# Patient Record
Sex: MALE | Race: WHITE | NOT HISPANIC OR LATINO | Employment: OTHER | URBAN - NONMETROPOLITAN AREA
[De-identification: names, ages, dates, MRNs, and addresses within clinical notes are randomized per-mention and may not be internally consistent; named-entity substitution may affect disease eponyms.]

---

## 2023-10-01 DIAGNOSIS — I10 HYPERTENSION, UNSPECIFIED TYPE: ICD-10-CM

## 2023-10-05 PROBLEM — M54.9 BACK PAIN, ACUTE: Status: ACTIVE | Noted: 2023-10-05

## 2023-10-05 PROBLEM — I25.10 CAD (CORONARY ARTERY DISEASE): Status: ACTIVE | Noted: 2023-10-05

## 2023-10-05 PROBLEM — Z98.84 S/P LAPAROSCOPIC SLEEVE GASTRECTOMY: Status: ACTIVE | Noted: 2023-10-05

## 2023-10-05 PROBLEM — E11.9 DIABETES MELLITUS (MULTI): Status: ACTIVE | Noted: 2023-10-05

## 2023-10-05 PROBLEM — R94.30 ABNORMAL RESULTS OF CARDIOVASCULAR FUNCTION STUDIES: Status: ACTIVE | Noted: 2023-10-05

## 2023-10-05 PROBLEM — E55.9 VITAMIN D DEFICIENCY: Status: ACTIVE | Noted: 2023-10-05

## 2023-10-05 PROBLEM — E83.10 IRON STORAGE DISEASE: Status: ACTIVE | Noted: 2023-10-05

## 2023-10-05 PROBLEM — R94.8 NONSPECIFIC ABNORMAL RESULTS OF FUNCTION STUDY: Status: ACTIVE | Noted: 2023-10-05

## 2023-10-05 PROBLEM — I26.99 PULMONARY EMBOLISM (MULTI): Status: ACTIVE | Noted: 2023-10-05

## 2023-10-05 PROBLEM — N39.0 URINARY TRACT INFECTION: Status: ACTIVE | Noted: 2023-10-05

## 2023-10-05 PROBLEM — G89.18 OTHER ACUTE POSTPROCEDURAL PAIN: Status: ACTIVE | Noted: 2023-10-05

## 2023-10-05 PROBLEM — I10 HYPERTENSION: Status: ACTIVE | Noted: 2023-10-05

## 2023-10-05 PROBLEM — M19.90 ARTHRITIS: Status: ACTIVE | Noted: 2023-10-05

## 2023-10-05 PROBLEM — E78.5 HYPERLIPEMIA: Status: ACTIVE | Noted: 2023-10-05

## 2023-10-05 PROBLEM — R63.8: Status: ACTIVE | Noted: 2023-10-05

## 2023-10-05 PROBLEM — Z98.84 BARIATRIC SURGERY STATUS: Status: ACTIVE | Noted: 2023-10-05

## 2023-10-05 PROBLEM — I49.9 ARRHYTHMIA: Status: ACTIVE | Noted: 2023-10-05

## 2023-10-05 PROBLEM — G47.33 OBSTRUCTIVE SLEEP APNEA SYNDROME: Status: ACTIVE | Noted: 2023-10-05

## 2023-10-05 RX ORDER — CALCIUM CARBONATE 600 MG
1 TABLET ORAL DAILY
COMMUNITY
End: 2023-11-15 | Stop reason: WASHOUT

## 2023-10-05 RX ORDER — AMLODIPINE BESYLATE 5 MG/1
1 TABLET ORAL DAILY
COMMUNITY
End: 2023-11-15 | Stop reason: ALTCHOICE

## 2023-10-05 RX ORDER — IBUPROFEN 100 MG/5ML
1 SUSPENSION, ORAL (FINAL DOSE FORM) ORAL DAILY
COMMUNITY
End: 2023-11-15 | Stop reason: WASHOUT

## 2023-10-05 RX ORDER — TRAMADOL HYDROCHLORIDE 50 MG/1
1 TABLET ORAL 3 TIMES DAILY PRN
COMMUNITY
Start: 2017-01-03 | End: 2023-11-15 | Stop reason: WASHOUT

## 2023-10-05 RX ORDER — AMOXICILLIN 500 MG/1
2000 CAPSULE ORAL
COMMUNITY

## 2023-10-05 RX ORDER — HYDROXYUREA 500 MG/1
1 CAPSULE ORAL 2 TIMES DAILY
COMMUNITY

## 2023-10-05 RX ORDER — LOSARTAN POTASSIUM 100 MG/1
1 TABLET ORAL DAILY
COMMUNITY
End: 2024-05-15 | Stop reason: SDUPTHER

## 2023-10-05 RX ORDER — UBIDECARENONE 75 MG
1 CAPSULE ORAL DAILY
COMMUNITY

## 2023-10-05 RX ORDER — ATORVASTATIN CALCIUM 40 MG/1
1 TABLET, FILM COATED ORAL NIGHTLY
COMMUNITY
End: 2024-05-15 | Stop reason: SDUPTHER

## 2023-10-05 RX ORDER — VIT A/VIT C/VIT E/ZINC/COPPER 2148-113
1 TABLET ORAL EVERY 12 HOURS
COMMUNITY

## 2023-10-05 RX ORDER — TROSPIUM CHLORIDE 20 MG/1
20 TABLET, FILM COATED ORAL 2 TIMES DAILY
COMMUNITY
Start: 2023-08-26

## 2023-10-05 RX ORDER — ASPIRIN 81 MG/1
1 TABLET ORAL DAILY
COMMUNITY

## 2023-10-06 RX ORDER — AMLODIPINE BESYLATE 5 MG/1
5 TABLET ORAL DAILY
Qty: 90 TABLET | Refills: 3 | Status: SHIPPED | OUTPATIENT
Start: 2023-10-06 | End: 2024-05-15 | Stop reason: SDUPTHER

## 2023-11-15 ENCOUNTER — OFFICE VISIT (OUTPATIENT)
Dept: CARDIOLOGY | Facility: CLINIC | Age: 73
End: 2023-11-15
Payer: MEDICARE

## 2023-11-15 VITALS
HEART RATE: 60 BPM | SYSTOLIC BLOOD PRESSURE: 140 MMHG | HEIGHT: 70 IN | DIASTOLIC BLOOD PRESSURE: 80 MMHG | WEIGHT: 253 LBS | BODY MASS INDEX: 36.22 KG/M2

## 2023-11-15 DIAGNOSIS — E78.2 MIXED HYPERLIPIDEMIA: ICD-10-CM

## 2023-11-15 DIAGNOSIS — I25.10 CORONARY ARTERY DISEASE INVOLVING NATIVE CORONARY ARTERY OF NATIVE HEART WITHOUT ANGINA PECTORIS: Primary | ICD-10-CM

## 2023-11-15 DIAGNOSIS — I49.8 OTHER CARDIAC ARRHYTHMIA: ICD-10-CM

## 2023-11-15 DIAGNOSIS — G47.33 OBSTRUCTIVE SLEEP APNEA SYNDROME: ICD-10-CM

## 2023-11-15 DIAGNOSIS — I26.93 SINGLE SUBSEGMENTAL PULMONARY EMBOLISM WITHOUT ACUTE COR PULMONALE (MULTI): ICD-10-CM

## 2023-11-15 DIAGNOSIS — E11.9 TYPE 2 DIABETES MELLITUS WITHOUT COMPLICATION, WITHOUT LONG-TERM CURRENT USE OF INSULIN (MULTI): ICD-10-CM

## 2023-11-15 DIAGNOSIS — I10 PRIMARY HYPERTENSION: ICD-10-CM

## 2023-11-15 PROBLEM — R94.30 ABNORMAL RESULTS OF CARDIOVASCULAR FUNCTION STUDIES: Status: RESOLVED | Noted: 2023-10-05 | Resolved: 2023-11-15

## 2023-11-15 PROCEDURE — 1160F RVW MEDS BY RX/DR IN RCRD: CPT | Performed by: NURSE PRACTITIONER

## 2023-11-15 PROCEDURE — 3077F SYST BP >= 140 MM HG: CPT | Performed by: NURSE PRACTITIONER

## 2023-11-15 PROCEDURE — 3008F BODY MASS INDEX DOCD: CPT | Performed by: NURSE PRACTITIONER

## 2023-11-15 PROCEDURE — 4010F ACE/ARB THERAPY RXD/TAKEN: CPT | Performed by: NURSE PRACTITIONER

## 2023-11-15 PROCEDURE — 99213 OFFICE O/P EST LOW 20 MIN: CPT | Performed by: NURSE PRACTITIONER

## 2023-11-15 PROCEDURE — 3079F DIAST BP 80-89 MM HG: CPT | Performed by: NURSE PRACTITIONER

## 2023-11-15 PROCEDURE — 1159F MED LIST DOCD IN RCRD: CPT | Performed by: NURSE PRACTITIONER

## 2023-11-15 RX ORDER — FUROSEMIDE 20 MG/1
20 TABLET ORAL EVERY OTHER DAY
COMMUNITY
Start: 2023-10-29 | End: 2024-05-15 | Stop reason: SDUPTHER

## 2023-11-15 NOTE — PATIENT INSTRUCTIONS
Please bring all medicines, vitamins, and herbal supplements with you when you come to the office.    Prescriptions will not be filled unless you are compliant with your follow up appointments or have a follow up appointment scheduled as per instruction of your physician. Refills should be requested at the time of your visit.     PLAN:   Through informed decision making process incorporating patients unique circumstances, the following treatment plan will be initiated:    1.  Prescription drug management of cardiovascular medication for efficacy, adherence to treatment, side effect assessment and polypharmacy. Current treatment clinically warranted and to continue without modifications.    2. Referral to [facility] sleep clinic (known FILEMON on bipap with no prior calibration; nocturnal bradycardia)    3. Return for follow-up; in the interim, contact the office if new symptoms arise.   Dr. Fletcher 6 months

## 2023-11-16 ASSESSMENT — ENCOUNTER SYMPTOMS
ORTHOPNEA: 0
SYNCOPE: 0
DYSPNEA ON EXERTION: 0
IRREGULAR HEARTBEAT: 0
PND: 0
NEAR-SYNCOPE: 0
PALPITATIONS: 0

## 2023-11-16 NOTE — ASSESSMENT & PLAN NOTE
November 2021 cardiac cath  Left system normal  Mid/distal RCA 50%  PLV 50%    Current daily activity greater than 4 METS without recurrent symptoms

## 2023-11-16 NOTE — ASSESSMENT & PLAN NOTE
November 2021 atrial tachycardia  Nocturnal bradycardia  November 2021 Holter no evidence of high-grade heart block

## 2023-11-16 NOTE — PROGRESS NOTES
"Chief Complaint  \" Doing perfect\"    Reason for Visit  Routine 6-month follow-up  Patient presents to the office today for outpatient follow-up for hypertension, hyperlipidemia, and coronary artery disease  Last evaluated in clinic by Dr. Beavers May 2023    Presents today ambulatory with steady gait.  Accompanied by spouse  Patient denies any hospitalizations or significant changes to interval medical history since last office follow-up.     History of Present Illness   Patient remains an extremely pleasant 73 gentleman who presents today without voiced complaints.  He reports his wife recently had a CVA and he has been primary caregiver.  He has been doing housework, working on the yard.  He denies any exertional chest pain, no dyspnea on exertion.  He denies any change in exercise capacity or functional tolerance.    Patient reports that overall has no complaint(s) of chest pain, chest pressure/discomfort, exertional chest pressure/discomfort, fatigue, lower extremity edema, palpitations, paroxysmal nocturnal dyspnea, and syncope    The importance of primary prevention reviewed:  HTN: Optimal  HLD: Treated  DM: Diet controlled with hemoglobin A1c 5.1  Smoker: Denies  BMI:  Reviewed the merits of healthy lifestyle choices on overall cardiovascular health.    He does report that occasionally his Apple Watch shows a slow heart rate while he is sleeping.  He does have documented nocturnal bradycardia in the past.  He remains compliant with his BiPAP machine but it has not been checked\" for a number of years\".  Will refer to sleep study for evaluation and calibration of BiPAP.    Overall patient is pleased with current state of cardiovascular health.  At this time there are no indications for additional cardiovascular testing or need for medication changes.     Review of Systems   Cardiovascular:  Negative for chest pain, dyspnea on exertion, irregular heartbeat, leg swelling, near-syncope, orthopnea, palpitations, " "paroxysmal nocturnal dyspnea and syncope.        Visit Vitals  /80 (BP Location: Left arm, Patient Position: Standing)   Pulse 60   Ht 1.778 m (5' 10\")   Wt 115 kg (253 lb)   BMI 36.30 kg/m²   Smoking Status Former   BSA 2.38 m²     Physical Exam  Vitals and nursing note reviewed.   Constitutional:       Appearance: Normal appearance.   Cardiovascular:      Rate and Rhythm: Normal rate and regular rhythm.      Heart sounds: Normal heart sounds.   Pulmonary:      Effort: Pulmonary effort is normal.      Breath sounds: Normal breath sounds.   Musculoskeletal:      Cervical back: Full passive range of motion without pain.      Right lower leg: No edema.      Left lower leg: No edema.   Skin:     General: Skin is cool.   Neurological:      Mental Status: He is alert and oriented to person, place, and time.   Psychiatric:         Attention and Perception: Attention normal.         Mood and Affect: Mood normal.         Behavior: Behavior is cooperative.      No Known Allergies    Current Outpatient Medications   Medication Instructions    amLODIPine (NORVASC) 5 mg, oral, Daily    amoxicillin (AMOXIL) 2,000 mg, oral, 1 hour prior to dental appointments.    aspirin 81 mg EC tablet 1 tablet, oral, Daily    atorvastatin (Lipitor) 40 mg tablet 1 tablet, oral, Nightly    cyanocobalamin (Vitamin B-12) 500 mcg tablet 1 tablet, oral, Daily    furosemide (LASIX) 20 mg, oral, Every other day    Hydrea 500 mg capsule 1 capsule, oral, 2 times daily    losartan (Cozaar) 100 mg tablet 1 tablet, oral, Daily    trospium (SANCTURA) 20 mg, oral, 2 times daily    vitamins A,C,E-zinc-copper (PreserVision AREDS) 2,148 mcg-113 mg-45 mg-17.4mg tablet 1 tablet, oral, Every 12 hours      Assessment:      Arrhythmia  November 2021 atrial tachycardia  Nocturnal bradycardia  November 2021 Holter no evidence of high-grade heart block    CAD (coronary artery disease)  November 2021 cardiac cath  Left system normal  Mid/distal RCA 50%  PLV " "50%    Current daily activity greater than 4 METS without recurrent symptoms    Hyperlipemia  Tolerating moderate intensity statin  Has annual wellness labs    Hypertension  Optimal in office    Pulmonary embolism (CMS/[facility])  September 2021 small left apical PE-no RV strain  DVT in the setting of LLE cellulitis  Reports being off anticoagulation for\" quite a while\"    Diabetes mellitus (CMS/[facility])  Reports diet controlled following weight loss after gastric sleeve  Reports most recent hemoglobin A1c 5.1    BMI 36.0-36.9,adult  Reviewed the merits of healthy lifestyle choices on overall cardiovascular health.      Cardiac and Vasculature  November 2021 TTE  LVEF 55 to 60%    Plan:   Current treatment plan is effective, no change in therapy.  Reviewed diet, exercise and weight control.  Use and side effects of nitroglycerine reviewed, call 911 if chest pain unrelieved by 3 tablets.      Through informed decision making process incorporating patients unique circumstances, the following treatment plan will be initiated:    1.  Prescription drug management of cardiovascular medication for efficacy, adherence to treatment, side effect assessment and polypharmacy. Current treatment clinically warranted and to continue without modifications.    2. Referral to [facility] sleep clinic (known FILEMON on bipap with no prior calibration; nocturnal bradycardia)    3. Return for follow-up; in the interim, contact the office if new symptoms arise.   Dr. Fletcher 6 months     Farzaneh Lewis  MSN, APRN-CNP, PMHNP-BC  [facility]    Please excuse any errors in grammar or translation related to this dictation. Voice recognition software was utilized to prepare this document.  "

## 2023-11-16 NOTE — ASSESSMENT & PLAN NOTE
"September 2021 small left apical PE-no RV strain  DVT in the setting of LLE cellulitis  Reports being off anticoagulation for\" quite a while\"  "

## 2023-11-16 NOTE — ASSESSMENT & PLAN NOTE
Reports diet controlled following weight loss after gastric sleeve  Reports most recent hemoglobin A1c 5.1

## 2024-05-15 ENCOUNTER — OFFICE VISIT (OUTPATIENT)
Dept: CARDIOLOGY | Facility: CLINIC | Age: 74
End: 2024-05-15
Payer: MEDICARE

## 2024-05-15 VITALS
WEIGHT: 254 LBS | DIASTOLIC BLOOD PRESSURE: 78 MMHG | HEART RATE: 62 BPM | HEIGHT: 70 IN | BODY MASS INDEX: 36.36 KG/M2 | SYSTOLIC BLOOD PRESSURE: 140 MMHG

## 2024-05-15 DIAGNOSIS — I25.10 CORONARY ARTERY DISEASE INVOLVING NATIVE CORONARY ARTERY OF NATIVE HEART WITHOUT ANGINA PECTORIS: ICD-10-CM

## 2024-05-15 DIAGNOSIS — Z87.891 FORMER SMOKER: ICD-10-CM

## 2024-05-15 DIAGNOSIS — I10 HYPERTENSION, UNSPECIFIED TYPE: ICD-10-CM

## 2024-05-15 DIAGNOSIS — E78.2 MIXED HYPERLIPIDEMIA: Primary | ICD-10-CM

## 2024-05-15 DIAGNOSIS — I10 PRIMARY HYPERTENSION: ICD-10-CM

## 2024-05-15 PROCEDURE — 3077F SYST BP >= 140 MM HG: CPT | Performed by: INTERNAL MEDICINE

## 2024-05-15 PROCEDURE — 3078F DIAST BP <80 MM HG: CPT | Performed by: INTERNAL MEDICINE

## 2024-05-15 PROCEDURE — 3008F BODY MASS INDEX DOCD: CPT | Performed by: INTERNAL MEDICINE

## 2024-05-15 PROCEDURE — 1159F MED LIST DOCD IN RCRD: CPT | Performed by: INTERNAL MEDICINE

## 2024-05-15 PROCEDURE — 4010F ACE/ARB THERAPY RXD/TAKEN: CPT | Performed by: INTERNAL MEDICINE

## 2024-05-15 PROCEDURE — 99213 OFFICE O/P EST LOW 20 MIN: CPT | Performed by: INTERNAL MEDICINE

## 2024-05-15 RX ORDER — LOSARTAN POTASSIUM 100 MG/1
100 TABLET ORAL DAILY
Qty: 90 TABLET | Refills: 3 | Status: SHIPPED | OUTPATIENT
Start: 2024-05-15 | End: 2025-05-15

## 2024-05-15 RX ORDER — AMLODIPINE BESYLATE 5 MG/1
5 TABLET ORAL DAILY
Qty: 90 TABLET | Refills: 3 | Status: SHIPPED | OUTPATIENT
Start: 2024-05-15 | End: 2025-05-15

## 2024-05-15 RX ORDER — FUROSEMIDE 20 MG/1
20 TABLET ORAL EVERY OTHER DAY
Qty: 45 TABLET | Refills: 3 | Status: SHIPPED | OUTPATIENT
Start: 2024-05-15 | End: 2025-05-15

## 2024-05-15 RX ORDER — ATORVASTATIN CALCIUM 40 MG/1
40 TABLET, FILM COATED ORAL NIGHTLY
Qty: 90 TABLET | Refills: 3 | Status: SHIPPED | OUTPATIENT
Start: 2024-05-15 | End: 2025-05-15

## 2024-05-15 NOTE — PROGRESS NOTES
"Subjective   Parish Bond is a 74 y.o. male       Chief Complaint    Follow-up          HPI     Review of Systems   All other systems reviewed and are negative.     Patient returns in follow-up of problems as noted.  He is doing well.  He denies angina CHF or arrhythmia symptomatology and detailed questioning was undertaken in this regard.  Review of treatment demonstrates that his lipid and blood pressure management is adequate and appropriate because of this we recommend no adjustments or changes in therapy.  The reason and the rationale for continued therapy as before was explained and he agrees to continue as before.      Vitals:    05/15/24 1446   BP: 140/78   BP Location: Right arm   Patient Position: Sitting   Pulse: 62   Weight: 115 kg (254 lb)   Height: 1.778 m (5' 10\")        Objective   Physical Exam  Constitutional:       Appearance: Normal appearance.   HENT:      Nose: Nose normal.   Neck:      Vascular: No carotid bruit.   Cardiovascular:      Rate and Rhythm: Normal rate.      Pulses: Normal pulses.      Heart sounds: Normal heart sounds.   Pulmonary:      Effort: Pulmonary effort is normal.   Abdominal:      General: Bowel sounds are normal.      Palpations: Abdomen is soft.   Musculoskeletal:         General: Normal range of motion.      Cervical back: Normal range of motion.      Right lower leg: No edema.      Left lower leg: No edema.   Skin:     General: Skin is warm and dry.   Neurological:      General: No focal deficit present.      Mental Status: He is alert.   Psychiatric:         Mood and Affect: Mood normal.         Behavior: Behavior normal.         Thought Content: Thought content normal.         Judgment: Judgment normal.         Allergies  Patient has no known allergies.     Current Medications    Current Outpatient Medications:     amLODIPine (Norvasc) 5 mg tablet, TAKE 1 TABLET BY MOUTH DAILY, Disp: 90 tablet, Rfl: 3    amoxicillin (Amoxil) 500 mg capsule, Take 4 capsules (2,000 " mg) by mouth. 1 hour prior to dental appointments., Disp: , Rfl:     aspirin 81 mg EC tablet, Take 1 tablet (81 mg) by mouth once daily., Disp: , Rfl:     atorvastatin (Lipitor) 40 mg tablet, Take 1 tablet (40 mg) by mouth once daily at bedtime., Disp: , Rfl:     cyanocobalamin (Vitamin B-12) 500 mcg tablet, Take 1 tablet (500 mcg) by mouth once daily., Disp: , Rfl:     furosemide (Lasix) 20 mg tablet, Take 1 tablet (20 mg) by mouth every other day., Disp: , Rfl:     Hydrea 500 mg capsule, Take 1 capsule (500 mg) by mouth 2 times a day., Disp: , Rfl:     losartan (Cozaar) 100 mg tablet, Take 1 tablet (100 mg) by mouth once daily., Disp: , Rfl:     trospium (Sanctura) 20 mg tablet, Take 1 tablet (20 mg) by mouth 2 times a day., Disp: , Rfl:     vitamins A,C,E-zinc-copper (PreserVision AREDS) 2,148 mcg-113 mg-45 mg-17.4mg tablet, Take 1 tablet by mouth every 12 hours., Disp: , Rfl:                      Assessment/Plan   1. Coronary artery disease involving native coronary artery of native heart without angina pectoris  No recurrence of symptoms that preceded his evaluation.  - Follow Up In Cardiology    2. Mixed hyperlipidemia  Review of treatment strategy demonstrates good control    3. Primary hypertension  Review of treatment strategy demonstrates good control    4. BMI 36.0-36.9,adult  The merits of diet and weight loss were advocated    5. Former smoker  Congratulated on cessation    6. Hypertension, unspecified type  As above      Scribe Attestation  By signing my name below, I, Canelo Dong LPN   attest that this documentation has been prepared under the direction and in the presence of Logan Fletcher MD.     Provider Attestation - Scribe documentation    All medical record entries made by the Scribe were at my direction and personally dictated by me. I have reviewed the chart and agree that the record accurately reflects my personal performance of the history, physical exam, discussion and plan.

## 2024-05-15 NOTE — LETTER
"May 16, 2024     Glory Russell DO  [address] [place] [address]    Patient: Parish Bond   YOB: 1950   Date of Visit: 5/15/2024       Dear Dr. Glory Russell DO:    Thank you for referring Parish Bond to me for evaluation. Below are my notes for this consultation.  If you have questions, please do not hesitate to call me. I look forward to following your patient along with you.       Sincerely,     Logan Fletcher MD      CC: No Recipients  ______________________________________________________________________________________    Subjective   Parish Bond is a 74 y.o. male       Chief Complaint    Follow-up          HPI     Review of Systems   All other systems reviewed and are negative.     Patient returns in follow-up of problems as noted.  He is doing well.  He denies angina CHF or arrhythmia symptomatology and detailed questioning was undertaken in this regard.  Review of treatment demonstrates that his lipid and blood pressure management is adequate and appropriate because of this we recommend no adjustments or changes in therapy.  The reason and the rationale for continued therapy as before was explained and he agrees to continue as before.      Vitals:    05/15/24 1446   BP: 140/78   BP Location: Right arm   Patient Position: Sitting   Pulse: 62   Weight: 115 kg (254 lb)   Height: 1.778 m (5' 10\")        Objective   Physical Exam  Constitutional:       Appearance: Normal appearance.   HENT:      Nose: Nose normal.   Neck:      Vascular: No carotid bruit.   Cardiovascular:      Rate and Rhythm: Normal rate.      Pulses: Normal pulses.      Heart sounds: Normal heart sounds.   Pulmonary:      Effort: Pulmonary effort is normal.   Abdominal:      General: Bowel sounds are normal.      Palpations: Abdomen is soft.   Musculoskeletal:         General: Normal range of motion.      Cervical back: Normal range of motion.      Right lower leg: No edema.      Left lower leg: No " Straight cath performed, pt tolerated well. Pt david area cleansed.  Perineal area and buttocks with redness noted     Huong Fernandes RN  08/21/20 8391 edema.   Skin:     General: Skin is warm and dry.   Neurological:      General: No focal deficit present.      Mental Status: He is alert.   Psychiatric:         Mood and Affect: Mood normal.         Behavior: Behavior normal.         Thought Content: Thought content normal.         Judgment: Judgment normal.         Allergies  Patient has no known allergies.     Current Medications    Current Outpatient Medications:   •  amLODIPine (Norvasc) 5 mg tablet, TAKE 1 TABLET BY MOUTH DAILY, Disp: 90 tablet, Rfl: 3  •  amoxicillin (Amoxil) 500 mg capsule, Take 4 capsules (2,000 mg) by mouth. 1 hour prior to dental appointments., Disp: , Rfl:   •  aspirin 81 mg EC tablet, Take 1 tablet (81 mg) by mouth once daily., Disp: , Rfl:   •  atorvastatin (Lipitor) 40 mg tablet, Take 1 tablet (40 mg) by mouth once daily at bedtime., Disp: , Rfl:   •  cyanocobalamin (Vitamin B-12) 500 mcg tablet, Take 1 tablet (500 mcg) by mouth once daily., Disp: , Rfl:   •  furosemide (Lasix) 20 mg tablet, Take 1 tablet (20 mg) by mouth every other day., Disp: , Rfl:   •  Hydrea 500 mg capsule, Take 1 capsule (500 mg) by mouth 2 times a day., Disp: , Rfl:   •  losartan (Cozaar) 100 mg tablet, Take 1 tablet (100 mg) by mouth once daily., Disp: , Rfl:   •  trospium (Sanctura) 20 mg tablet, Take 1 tablet (20 mg) by mouth 2 times a day., Disp: , Rfl:   •  vitamins A,C,E-zinc-copper (PreserVision AREDS) 2,148 mcg-113 mg-45 mg-17.4mg tablet, Take 1 tablet by mouth every 12 hours., Disp: , Rfl:                      Assessment/Plan   1. Coronary artery disease involving native coronary artery of native heart without angina pectoris  No recurrence of symptoms that preceded his evaluation.  - Follow Up In Cardiology    2. Mixed hyperlipidemia  Review of treatment strategy demonstrates good control    3. Primary hypertension  Review of treatment strategy demonstrates good control    4. BMI 36.0-36.9,adult  The merits of diet and weight loss were  advocated    5. Former smoker  Congratulated on cessation    6. Hypertension, unspecified type  As above      Scribe Attestation  By signing my name below, I, Canelo Dong LPN   attest that this documentation has been prepared under the direction and in the presence of Logan Fletcher MD.     Provider Attestation - Scribe documentation    All medical record entries made by the Scribe were at my direction and personally dictated by me. I have reviewed the chart and agree that the record accurately reflects my personal performance of the history, physical exam, discussion and plan.

## 2024-11-14 ENCOUNTER — APPOINTMENT (OUTPATIENT)
Dept: CARDIOLOGY | Facility: CLINIC | Age: 74
End: 2024-11-14
Payer: MEDICARE

## 2025-02-03 ENCOUNTER — APPOINTMENT (OUTPATIENT)
Dept: CARDIOLOGY | Facility: CLINIC | Age: 75
End: 2025-02-03
Payer: MEDICARE

## 2025-02-03 VITALS
WEIGHT: 242 LBS | HEIGHT: 70 IN | BODY MASS INDEX: 34.65 KG/M2 | DIASTOLIC BLOOD PRESSURE: 78 MMHG | HEART RATE: 53 BPM | SYSTOLIC BLOOD PRESSURE: 132 MMHG

## 2025-02-03 DIAGNOSIS — E78.2 MIXED HYPERLIPIDEMIA: ICD-10-CM

## 2025-02-03 DIAGNOSIS — I10 PRIMARY HYPERTENSION: ICD-10-CM

## 2025-02-03 DIAGNOSIS — I25.10 CORONARY ARTERY DISEASE INVOLVING NATIVE CORONARY ARTERY OF NATIVE HEART WITHOUT ANGINA PECTORIS: ICD-10-CM

## 2025-02-03 DIAGNOSIS — G47.33 OBSTRUCTIVE SLEEP APNEA SYNDROME: ICD-10-CM

## 2025-02-03 DIAGNOSIS — I25.10 MILD CORONARY ARTERY DISEASE: Primary | ICD-10-CM

## 2025-02-03 DIAGNOSIS — Z87.891 FORMER SMOKER: ICD-10-CM

## 2025-02-03 DIAGNOSIS — I10 HYPERTENSION, UNSPECIFIED TYPE: ICD-10-CM

## 2025-02-03 DIAGNOSIS — Z72.0 CHEWS TOBACCO: ICD-10-CM

## 2025-02-03 PROBLEM — R00.1 SINUS BRADYCARDIA: Status: ACTIVE | Noted: 2025-02-03

## 2025-02-03 PROBLEM — R94.8 NONSPECIFIC ABNORMAL RESULTS OF FUNCTION STUDY: Status: RESOLVED | Noted: 2023-10-05 | Resolved: 2025-02-03

## 2025-02-03 PROCEDURE — 4010F ACE/ARB THERAPY RXD/TAKEN: CPT | Performed by: INTERNAL MEDICINE

## 2025-02-03 PROCEDURE — 3075F SYST BP GE 130 - 139MM HG: CPT | Performed by: INTERNAL MEDICINE

## 2025-02-03 PROCEDURE — 4004F PT TOBACCO SCREEN RCVD TLK: CPT | Performed by: INTERNAL MEDICINE

## 2025-02-03 PROCEDURE — 3078F DIAST BP <80 MM HG: CPT | Performed by: INTERNAL MEDICINE

## 2025-02-03 PROCEDURE — G2211 COMPLEX E/M VISIT ADD ON: HCPCS | Performed by: INTERNAL MEDICINE

## 2025-02-03 PROCEDURE — 93000 ELECTROCARDIOGRAM COMPLETE: CPT | Performed by: INTERNAL MEDICINE

## 2025-02-03 PROCEDURE — 3008F BODY MASS INDEX DOCD: CPT | Performed by: INTERNAL MEDICINE

## 2025-02-03 PROCEDURE — 99214 OFFICE O/P EST MOD 30 MIN: CPT | Performed by: INTERNAL MEDICINE

## 2025-02-03 PROCEDURE — 1160F RVW MEDS BY RX/DR IN RCRD: CPT | Performed by: INTERNAL MEDICINE

## 2025-02-03 PROCEDURE — 1159F MED LIST DOCD IN RCRD: CPT | Performed by: INTERNAL MEDICINE

## 2025-02-03 RX ORDER — DOCUSATE SODIUM 100 MG/1
100 CAPSULE, LIQUID FILLED ORAL 2 TIMES DAILY
COMMUNITY

## 2025-02-03 RX ORDER — FOLIC ACID 1 MG/1
1 TABLET ORAL DAILY
COMMUNITY

## 2025-02-03 RX ORDER — LOSARTAN POTASSIUM 100 MG/1
100 TABLET ORAL DAILY
Qty: 90 TABLET | Refills: 3 | Status: SHIPPED | OUTPATIENT
Start: 2025-02-03 | End: 2026-02-03

## 2025-02-03 RX ORDER — ATORVASTATIN CALCIUM 40 MG/1
40 TABLET, FILM COATED ORAL NIGHTLY
Qty: 90 TABLET | Refills: 3 | Status: SHIPPED | OUTPATIENT
Start: 2025-02-03 | End: 2026-02-03

## 2025-02-03 RX ORDER — AMLODIPINE BESYLATE 5 MG/1
5 TABLET ORAL DAILY
Qty: 90 TABLET | Refills: 3 | Status: SHIPPED | OUTPATIENT
Start: 2025-02-03 | End: 2026-02-03

## 2025-02-03 RX ORDER — BISMUTH SUBSALICYLATE 262 MG
1 TABLET,CHEWABLE ORAL DAILY
COMMUNITY

## 2025-02-03 NOTE — LETTER
February 3, 2025     Glory Russell DO  [address] [place] [address]    Patient: Parish Bond   YOB: 1950   Date of Visit: 2/3/2025       Dear Dr. Glory Russell DO:    Thank you for referring Parish Bond to me for evaluation. Below are my notes for this consultation.  If you have questions, please do not hesitate to call me. I look forward to following your patient along with you.       Sincerely,     Dmitriy Chen MD      CC: No Recipients  ______________________________________________________________________________________    Subjective   Parish Bond is a 74 y.o. male       Chief Complaint    Follow-up          HPI   Patient is here for follow-up continue management for history of coronary artery disease, hypertension and hyperlipidemia.  He is a former patient of Dr. Fletcher.  Back in 2021 he presented with small acute coronary syndrome.  Cardiac catheterization showed only mild to moderate disease of the RCA medical therapy was recommended since then the patient reports he is done well.  He denies complaint of chest pain, palpitation, lightheadedness, dizziness or syncope.  He reports some social stress because his wife passed away due to complication of pneumonia and respiratory failure.  His recent lab noted and reviewed with him.    Assessment    1.  Mild atherosclerotic coronary artery disease diagnosed after presentation with small acute coronary syndrome.  Heart cath was done 2021 by Dr. Bahena showing mild disease of the distal RCA.  He described functional class I and denying any symptoms  2.  Primary hypertension well-controlled  3.  Mixed hyperlipidemia  4.  Obesity  5.  COPD  6.  Continued chewing tobacco  7.  Social stress due to the passing of his wife due to complication of pneumonia and respiratory failure  8.  Mild sinus bradycardia    Plan    1.  I reviewed with him his medication and his recent lab  2.  We discussed risk factor modification  3.   "Advised him to notify me change in cardiac status or symptoms  Review of Systems   All other systems reviewed and are negative.           Vitals:    02/03/25 0947   BP: 132/78   BP Location: Left arm   Patient Position: Sitting   Pulse: 53   Weight: 110 kg (242 lb)   Height: 1.778 m (5' 10\")      EKG done in office today     Objective   Physical Exam  Constitutional:       Appearance: Normal appearance.   HENT:      Nose: Nose normal.   Neck:      Vascular: No carotid bruit.   Cardiovascular:      Rate and Rhythm: Bradycardia present.      Pulses: Normal pulses.      Heart sounds: Normal heart sounds.   Pulmonary:      Effort: Pulmonary effort is normal.   Abdominal:      General: Bowel sounds are normal.      Palpations: Abdomen is soft.   Musculoskeletal:         General: Normal range of motion.      Cervical back: Normal range of motion.      Right lower leg: No edema.      Left lower leg: No edema.   Skin:     General: Skin is warm and dry.   Neurological:      General: No focal deficit present.      Mental Status: He is alert.   Psychiatric:         Mood and Affect: Mood normal.         Behavior: Behavior normal.         Thought Content: Thought content normal.         Judgment: Judgment normal.         Allergies  Patient has no known allergies.     Current Medications    Current Outpatient Medications:   •  amoxicillin (Amoxil) 500 mg capsule, Take 4 capsules (2,000 mg) by mouth. 1 hour prior to dental appointments., Disp: , Rfl:   •  aspirin 81 mg EC tablet, Take 1 tablet (81 mg) by mouth once daily., Disp: , Rfl:   •  cyanocobalamin (Vitamin B-12) 500 mcg tablet, Take 1 tablet (500 mcg) by mouth once daily., Disp: , Rfl:   •  docusate sodium (Colace) 100 mg capsule, Take 1 capsule (100 mg) by mouth 2 times a day., Disp: , Rfl:   •  folic acid (Folvite) 1 mg tablet, Take 1 tablet (1 mg) by mouth once daily., Disp: , Rfl:   •  furosemide (Lasix) 20 mg tablet, Take 1 tablet (20 mg) by mouth every other day. " (Patient taking differently: Take 1 tablet (20 mg) by mouth if needed.), Disp: 45 tablet, Rfl: 3  •  Hydrea 500 mg capsule, Take 1 capsule (500 mg) by mouth 2 times a day., Disp: , Rfl:   •  multivitamin tablet, Take 1 tablet by mouth once daily., Disp: , Rfl:   •  vitamins A,C,E-zinc-copper (PreserVision AREDS) 2,148 mcg-113 mg-45 mg-17.4mg tablet, Take 1 tablet by mouth every 12 hours., Disp: , Rfl:   •  amLODIPine (Norvasc) 5 mg tablet, Take 1 tablet (5 mg) by mouth once daily., Disp: 90 tablet, Rfl: 3  •  atorvastatin (Lipitor) 40 mg tablet, Take 1 tablet (40 mg) by mouth once daily at bedtime., Disp: 90 tablet, Rfl: 3  •  losartan (Cozaar) 100 mg tablet, Take 1 tablet (100 mg) by mouth once daily., Disp: 90 tablet, Rfl: 3                     Assessment/Plan   1. Mild coronary artery disease  Follow Up In Cardiology    ECG 12 Lead      2. Coronary artery disease involving native coronary artery of native heart without angina pectoris  Follow Up In Cardiology      3. Primary hypertension        4. Mixed hyperlipidemia  atorvastatin (Lipitor) 40 mg tablet      5. Former smoker        6. Obstructive sleep apnea syndrome        7. BMI 34.0-34.9,adult        8. Chews tobacco        9. Hypertension, unspecified type  losartan (Cozaar) 100 mg tablet    amLODIPine (Norvasc) 5 mg tablet               Scribe Attestation  By signing my name below, IEbony LPN, Scribcecilia   attest that this documentation has been prepared under the direction and in the presence of Dmitriy Chen MD.     Provider Attestation - Scribe documentation    All medical record entries made by the Scribe were at my direction and personally dictated by me. I have reviewed the chart and agree that the record accurately reflects my personal performance of the history, physical exam, discussion and plan.

## 2025-02-03 NOTE — PROGRESS NOTES
"Subjective   Parish Bond is a 74 y.o. male       Chief Complaint    Follow-up          HPI   Patient is here for follow-up continue management for history of coronary artery disease, hypertension and hyperlipidemia.  He is a former patient of Dr. Fletcher.  Back in 2021 he presented with small acute coronary syndrome.  Cardiac catheterization showed only mild to moderate disease of the RCA medical therapy was recommended since then the patient reports he is done well.  He denies complaint of chest pain, palpitation, lightheadedness, dizziness or syncope.  He reports some social stress because his wife passed away due to complication of pneumonia and respiratory failure.  His recent lab noted and reviewed with him.    Assessment    1.  Mild atherosclerotic coronary artery disease diagnosed after presentation with small acute coronary syndrome.  Heart cath was done 2021 by Dr. Bahena showing mild disease of the distal RCA.  He described functional class I and denying any symptoms  2.  Primary hypertension well-controlled  3.  Mixed hyperlipidemia  4.  Obesity  5.  COPD  6.  Continued chewing tobacco  7.  Social stress due to the passing of his wife due to complication of pneumonia and respiratory failure  8.  Mild sinus bradycardia    Plan    1.  I reviewed with him his medication and his recent lab  2.  We discussed risk factor modification  3.  Advised him to notify me change in cardiac status or symptoms  Review of Systems   All other systems reviewed and are negative.           Vitals:    02/03/25 0947   BP: 132/78   BP Location: Left arm   Patient Position: Sitting   Pulse: 53   Weight: 110 kg (242 lb)   Height: 1.778 m (5' 10\")      EKG done in office today     Objective   Physical Exam  Constitutional:       Appearance: Normal appearance.   HENT:      Nose: Nose normal.   Neck:      Vascular: No carotid bruit.   Cardiovascular:      Rate and Rhythm: Bradycardia present.      Pulses: Normal pulses.      Heart " sounds: Normal heart sounds.   Pulmonary:      Effort: Pulmonary effort is normal.   Abdominal:      General: Bowel sounds are normal.      Palpations: Abdomen is soft.   Musculoskeletal:         General: Normal range of motion.      Cervical back: Normal range of motion.      Right lower leg: No edema.      Left lower leg: No edema.   Skin:     General: Skin is warm and dry.   Neurological:      General: No focal deficit present.      Mental Status: He is alert.   Psychiatric:         Mood and Affect: Mood normal.         Behavior: Behavior normal.         Thought Content: Thought content normal.         Judgment: Judgment normal.         Allergies  Patient has no known allergies.     Current Medications    Current Outpatient Medications:     amoxicillin (Amoxil) 500 mg capsule, Take 4 capsules (2,000 mg) by mouth. 1 hour prior to dental appointments., Disp: , Rfl:     aspirin 81 mg EC tablet, Take 1 tablet (81 mg) by mouth once daily., Disp: , Rfl:     cyanocobalamin (Vitamin B-12) 500 mcg tablet, Take 1 tablet (500 mcg) by mouth once daily., Disp: , Rfl:     docusate sodium (Colace) 100 mg capsule, Take 1 capsule (100 mg) by mouth 2 times a day., Disp: , Rfl:     folic acid (Folvite) 1 mg tablet, Take 1 tablet (1 mg) by mouth once daily., Disp: , Rfl:     furosemide (Lasix) 20 mg tablet, Take 1 tablet (20 mg) by mouth every other day. (Patient taking differently: Take 1 tablet (20 mg) by mouth if needed.), Disp: 45 tablet, Rfl: 3    Hydrea 500 mg capsule, Take 1 capsule (500 mg) by mouth 2 times a day., Disp: , Rfl:     multivitamin tablet, Take 1 tablet by mouth once daily., Disp: , Rfl:     vitamins A,C,E-zinc-copper (PreserVision AREDS) 2,148 mcg-113 mg-45 mg-17.4mg tablet, Take 1 tablet by mouth every 12 hours., Disp: , Rfl:     amLODIPine (Norvasc) 5 mg tablet, Take 1 tablet (5 mg) by mouth once daily., Disp: 90 tablet, Rfl: 3    atorvastatin (Lipitor) 40 mg tablet, Take 1 tablet (40 mg) by mouth once daily  at bedtime., Disp: 90 tablet, Rfl: 3    losartan (Cozaar) 100 mg tablet, Take 1 tablet (100 mg) by mouth once daily., Disp: 90 tablet, Rfl: 3                     Assessment/Plan   1. Mild coronary artery disease  Follow Up In Cardiology    ECG 12 Lead      2. Coronary artery disease involving native coronary artery of native heart without angina pectoris  Follow Up In Cardiology      3. Primary hypertension        4. Mixed hyperlipidemia  atorvastatin (Lipitor) 40 mg tablet      5. Former smoker        6. Obstructive sleep apnea syndrome        7. BMI 34.0-34.9,adult        8. Chews tobacco        9. Hypertension, unspecified type  losartan (Cozaar) 100 mg tablet    amLODIPine (Norvasc) 5 mg tablet               Scribe Attestation  By signing my name below, I, Ebony MEJIA LPN  , Scribe   attest that this documentation has been prepared under the direction and in the presence of Dmitriy Chen MD.     Provider Attestation - Scribe documentation    All medical record entries made by the Scribe were at my direction and personally dictated by me. I have reviewed the chart and agree that the record accurately reflects my personal performance of the history, physical exam, discussion and plan.

## 2025-02-03 NOTE — PATIENT INSTRUCTIONS
Please bring all medicines, vitamins, and herbal supplements with you when you come to the office.    Prescriptions will not be filled unless you are compliant with your follow up appointments or have a follow up appointment scheduled as per instruction of your physician. Refills should be requested at the time of your visit.     BMI was above normal measurement. Current weight: 110 kg (242 lb)  Weight change since last visit (-) denotes wt loss -12 lbs   Weight loss needed to achieve BMI 25: 68.1 Lbs  Weight loss needed to achieve BMI 30: 33.4 Lbs  Provided instructions on dietary changes  Provided instructions on exercise.

## 2025-05-28 DIAGNOSIS — I10 HYPERTENSION, UNSPECIFIED TYPE: ICD-10-CM

## 2025-05-28 RX ORDER — FUROSEMIDE 20 MG/1
TABLET ORAL
Qty: 45 TABLET | Refills: 3 | Status: SHIPPED | OUTPATIENT
Start: 2025-05-28

## 2025-06-23 DIAGNOSIS — I10 HYPERTENSION, UNSPECIFIED TYPE: ICD-10-CM

## 2025-06-23 RX ORDER — AMLODIPINE BESYLATE 5 MG/1
5 TABLET ORAL DAILY
Qty: 90 TABLET | Refills: 3 | Status: SHIPPED | OUTPATIENT
Start: 2025-06-23

## 2026-02-10 ENCOUNTER — APPOINTMENT (OUTPATIENT)
Dept: CARDIOLOGY | Facility: CLINIC | Age: 76
End: 2026-02-10
Payer: MEDICARE